# Patient Record
(demographics unavailable — no encounter records)

---

## 2025-07-12 NOTE — DISCUSSION/SUMMARY
[FreeTextEntry1] : 44 YEAR OLD FEMALE LMP 6/29/2025 WITH CCYLES MONTHLY X 5-7 , USUALLY PREIVOUSLY LASTING 3 DAYS  PRESENTS FOR WELL  WOMAN GYNECOLGOIC EXAMINATION AND PAP. LAST PERIOD LASTED 8 DAYS AND WAS NOTED TO HAVE CLOTS. LAST SEEN FOR WELL WOMAN VISIT AND [facility] 8/14/2023; NO NEW MEDICAL OR SURGICAL HISTORY SINCE LAST VISIT. MEDICATIONS; NONE MEDICATION ALLERGIES; NONE ROS;BMS-NORMAL; NO FAM HISTORY OF COLON CANCER; NO COLONOSCOPY DONE YET           + OCCASIONAL OVERFLOW STRESS URINARY INCONTINENCE           SEXUALLY ACTIVE WITHOUT COMPLAINTS BREASTS-DOES BREAST SELF EXAMIATION                 [facility]                  PATERNAL GRANDMOTHER WITH BREAST CANCER IN HER 60'S + EXERCISE; + MULTIVITAMINS; AT TIMES; + DAIRY; NO TOBACCO OR VAPING  PE;BP  104/64; TEMP 98.1; WEIGHT 180 LBS HEIGHT 5'6"      BREASTS; NO MASSES OR DISCHARGES      ABDOMEN SOFT, NO MASSES; NO TENDERNESS TO PALPATION     PEVLIC NORMAL EXTERNAL GENITALIA                   NORMAL URETHRAL MEATUS                   NORMAL VAGINA WITHOUT LESION                   NORMAL CERVIX WITHOUT LESION BUT AGAIN SEEN IS AN ELONGATED POLYPOID                                   STRUCTURE PROTRUDING FROM THE EXTERNAL OS                   ANTEVERTED NORMAL UTERUS ON BIMANUAL EXAMINATION                   NO PALPABLE ADNEXAL MASSES  IMP; WELL WOMAN          MILD OVERFLOW STRESS URINARY INCONTINENCE          CERVICAL POLYP  PLAN; THIN PREP PAP WITH HR HPV TESTING DONE-PT TO CALL IN 2 WKS FOR RSULTS             MONTHLY BREAST SELF EXAMINATION CONTINUED TO BE STRONGLY ADVISED             ANNUAL MAMMOGRPAHY ADVISED AND REFERRAL SCRIPT GIVEN             COLONOSCOPY ADVISED TO BE DONE AT AGE 45 AND REFERRAL SCRIPT GIVEN             WILL TRY TO OBTAIN MAMMOGRPAHY REPORT FROM [facility]             RETURN TO OFFICE ONE YEAR OR PRN